# Patient Record
(demographics unavailable — no encounter records)

---

## 2024-12-19 NOTE — ASSESSMENT
[FreeTextEntry1] : 72-year-old gentleman status post right total knee replacement 2 years ago unable to walk in part related to flexion contracture this knee and in part were probably related to his Parkinson's.  He attributes his inability to walk related to the knee pain he has in his left knee when he has to weight-bear.  He would like to proceed with a left total knee replacement.  I spent some time explaining to the patient that with the degree of stiffness that he has in the replaced knee I am not sure that he is going to walk without assistance after the surgery but if he wants we can provide him with knee replacement.  He understands the risk benefits and alternatives to surgery and we will make arrangements once he obtains medical clearance for surgery.  He still contends with some diabetes and cardiac conditions.  He need cardiac clearance and a hemoglobin A1c below 7.5 prior to commencing with surgery.

## 2024-12-19 NOTE — IMAGING
[de-identified] : Pleasant middle-age gentleman sits comfortably my office no distress.  Physical examination: Right knee: Well-healed surgical scar.  Acceptable alignment maintained.  Knee motion is roughly 10 to 130 degrees.  He can hold his knee with flexion contracture out in extension.  No tenderness palpation about his knee.  Left knee: Remains with a 10 degree flexion contracture.  Flexion to 125 degrees.  Mild synovial thickening.  Medial joint line tenderness.  Abnormal patellofemoral grind test.  Calf soft no cords.  No varus valgus instability but slight varus alignment to the knee.  Radiographs: Deferred; last radiographs left knee October 11, 2022 demonstrated primarily medial compartment knee arthritis

## 2024-12-19 NOTE — HISTORY OF PRESENT ILLNESS
[de-identified] : 32-year-old gentleman returns for interval follow-up status post a right total knee replacement January 2023.  Spite the surgery he remains at a skilled nursing facility.  He is essentially wheelchair-bound.  Reports that his right knee does not really hurt him despite the fact that he remains with a 10 degree flexion contracture.  He has been unable to walk.  He would like the left knee replaced.  He is aspirations to have the knee replaced so that he can walk again without assistance.  Denies any fevers or drainage.  Remains with his myriad medical problems which by his report are unchanged.

## 2025-04-10 NOTE — DISCUSSION/SUMMARY
[de-identified] : Chief complaint: Follow-up on left ankle fracture  HPI: Patient is a 72-year-old male who presents to the office today via stretcher from his snf for follow-up on left ankle fracture.  He sustained a left ankle fracture on 3/28/2025.  Was brought to [facility].  Orthopedics was consulted.  X-rays were performed showing a left distal fibular fracture.  Patient was placed in a left lower extremity short leg walking cast and made weightbearing as tolerated.  Presents today for follow-up.  Still has pain to the left lower extremity.  No reported new fall, injury, or trauma.  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  Degenerative changes medial and lateral femoral tibial joint space.  Patient had three-view x-rays of the left ankle performed at [facility] on 3/29/2025 at which time the impression as per the radiologist was an acute slightly spiral fracture of the distal fibula.  Associated soft tissue swelling.  There is no lateral view.  2 view x-rays of the left ankle performed after placement of a cast impression as per the radiologist: Somewhat obscures evaluation of the soft tissues and bony detail.  Again demonstrated is a spiral fracture of the distal fibula gross anatomic alignment  Three-view x-rays of the left ankle performed in the office today with weightbearing show repeat demonstration of a spiral fracture of the left distal fibula without significant appreciable change in alignment when compared with x-rays performed at [facility] on 3/29/2025  Assessment/plan: Spiral fracture of the left distal fibula, this case was discussed with Dr. Mcelroy, at this time will continue nonoperative management   1. At this time the patient can remain in the left lower extremity short leg cast, patient can be weightbearing as tolerated in the cast, advised on cast care 2.  Discussed fracture healing with the patient, patient verbalized understanding 3.  Patient can be given pain medication on an as-needed basis at the discretion of the physician at his snf  Patient will be provided with a 2-week follow-up with Dr. Mcelroy for repeat evaluation, patient verbalized understanding of all findings in the office today, agrees to follow-up as directed

## 2025-04-23 NOTE — ASSESSMENT
[FreeTextEntry1] : Assessment: Approximately 4 weeks status post sustaining a left Pan B distal fibula fracture that is being treated nonoperatively in a cast.  Plan: 1.  Clinical and radiographic findings reviewed with the patient.  I reviewed today's x-rays with him.  I did discuss with him the risk of fracture nonunion/malunion, and that should this happen, he may be indicated for delayed surgical fixation..  We will continue to monitor for fracture healing radiographically.  He understood. 2.  Continue weightbearing as tolerated on the left lower extremity in the walking cast.  Recommend physical therapy for mobilization. 3.  He is on home anticoagulation per paperwork from his facility 4.  I would like to see him back for follow-up in about 2 weeks for repeat x-rays of the left ankle out of the cast.  Anticipate transitioning into a cam boot at that time.  These recommendations were conveyed to his facility via the provided form.  Plan of care discussed with the patient and he is in agreement.  All questions were answered.  X-rays needed at next visit: Left ankle out of cast

## 2025-04-23 NOTE — HISTORY OF PRESENT ILLNESS
[de-identified] : The patient is a 72-year-old male with a past medical history of atrial fibrillation on Eliquis, schizophrenia, Parkinson's disease, CVA, CAD status post PCI, diabetes, hypertension, who sustained a closed injury to his left ankle on 3/28/2025.  He was evaluated at [facility].  An external rotation stress view was performed at that time which demonstrated minimal medial clear space widening.  He was placed into a walking cast.  He presents from a rehab center today.  He reports that he has been doing well.  He has been trying to stay off of the left leg.  He is on Eliquis and a baby aspirin per the paperwork.  He reports no pain in his left ankle.  No issues with the cast.  The patient is well-appearing.  He is lying comfortably in a stretcher.  Examination of the left lower extremity demonstrates a short leg cast in place, intact.  Skin intact along the edges of the cast.  Left ankle x-rays in the cast taken in the office today were personally reviewed, demonstrating a Pan B displaced distal fibula fracture and likely a small non-- minimally displaced posterior malleolus fracture.  Ankle mortise intact.  There is mild displacement and shortening of the distal fibula fracture.